# Patient Record
Sex: FEMALE | Race: WHITE | NOT HISPANIC OR LATINO | URBAN - METROPOLITAN AREA
[De-identification: names, ages, dates, MRNs, and addresses within clinical notes are randomized per-mention and may not be internally consistent; named-entity substitution may affect disease eponyms.]

---

## 2017-03-30 ENCOUNTER — OFFICE VISIT - HEALTHEAST (OUTPATIENT)
Dept: INTERNAL MEDICINE | Facility: CLINIC | Age: 27
End: 2017-03-30

## 2017-03-30 DIAGNOSIS — J02.9 SORE THROAT: ICD-10-CM

## 2017-03-30 DIAGNOSIS — H69.93 EUSTACHIAN TUBE DYSFUNCTION, BILATERAL: ICD-10-CM

## 2017-03-30 ASSESSMENT — MIFFLIN-ST. JEOR: SCORE: 1492.41

## 2017-05-31 ENCOUNTER — OFFICE VISIT - HEALTHEAST (OUTPATIENT)
Dept: INTERNAL MEDICINE | Facility: CLINIC | Age: 27
End: 2017-05-31

## 2017-05-31 DIAGNOSIS — R63.5 WEIGHT GAIN: ICD-10-CM

## 2017-05-31 LAB — HBA1C MFR BLD: 4.7 % (ref 3.5–6)

## 2017-06-01 ENCOUNTER — COMMUNICATION - HEALTHEAST (OUTPATIENT)
Dept: INTERNAL MEDICINE | Facility: CLINIC | Age: 27
End: 2017-06-01

## 2017-06-07 ENCOUNTER — OFFICE VISIT - HEALTHEAST (OUTPATIENT)
Dept: INTERNAL MEDICINE | Facility: CLINIC | Age: 27
End: 2017-06-07

## 2017-06-07 DIAGNOSIS — R63.5 WEIGHT GAIN: ICD-10-CM

## 2017-06-13 ENCOUNTER — COMMUNICATION - HEALTHEAST (OUTPATIENT)
Dept: INTERNAL MEDICINE | Facility: CLINIC | Age: 27
End: 2017-06-13

## 2017-06-30 ENCOUNTER — COMMUNICATION - HEALTHEAST (OUTPATIENT)
Dept: INTERNAL MEDICINE | Facility: CLINIC | Age: 27
End: 2017-06-30

## 2017-06-30 DIAGNOSIS — Z30.41 USES ORAL CONTRACEPTION: ICD-10-CM

## 2017-07-27 ENCOUNTER — AMBULATORY - HEALTHEAST (OUTPATIENT)
Dept: INTERNAL MEDICINE | Facility: CLINIC | Age: 27
End: 2017-07-27

## 2017-07-27 ENCOUNTER — COMMUNICATION - HEALTHEAST (OUTPATIENT)
Dept: INTERNAL MEDICINE | Facility: CLINIC | Age: 27
End: 2017-07-27

## 2017-07-27 DIAGNOSIS — L30.9 DERMATITIS: ICD-10-CM

## 2017-08-07 ENCOUNTER — RECORDS - HEALTHEAST (OUTPATIENT)
Dept: ADMINISTRATIVE | Facility: OTHER | Age: 27
End: 2017-08-07

## 2017-09-18 ENCOUNTER — RECORDS - HEALTHEAST (OUTPATIENT)
Dept: ADMINISTRATIVE | Facility: OTHER | Age: 27
End: 2017-09-18

## 2017-10-11 ENCOUNTER — COMMUNICATION - HEALTHEAST (OUTPATIENT)
Dept: INTERNAL MEDICINE | Facility: CLINIC | Age: 27
End: 2017-10-11

## 2017-10-11 DIAGNOSIS — Z30.41 USES ORAL CONTRACEPTION: ICD-10-CM

## 2018-01-03 ENCOUNTER — COMMUNICATION - HEALTHEAST (OUTPATIENT)
Dept: INTERNAL MEDICINE | Facility: CLINIC | Age: 28
End: 2018-01-03

## 2018-01-03 DIAGNOSIS — Z30.41 USES ORAL CONTRACEPTION: ICD-10-CM

## 2018-01-04 ENCOUNTER — COMMUNICATION - HEALTHEAST (OUTPATIENT)
Dept: SCHEDULING | Facility: CLINIC | Age: 28
End: 2018-01-04

## 2018-03-26 ENCOUNTER — COMMUNICATION - HEALTHEAST (OUTPATIENT)
Dept: INTERNAL MEDICINE | Facility: CLINIC | Age: 28
End: 2018-03-26

## 2018-03-26 DIAGNOSIS — Z30.41 USES ORAL CONTRACEPTION: ICD-10-CM

## 2018-04-18 ENCOUNTER — RECORDS - HEALTHEAST (OUTPATIENT)
Dept: ADMINISTRATIVE | Facility: OTHER | Age: 28
End: 2018-04-18

## 2018-04-20 ENCOUNTER — RECORDS - HEALTHEAST (OUTPATIENT)
Dept: ADMINISTRATIVE | Facility: OTHER | Age: 28
End: 2018-04-20

## 2018-06-25 ENCOUNTER — COMMUNICATION - HEALTHEAST (OUTPATIENT)
Dept: INTERNAL MEDICINE | Facility: CLINIC | Age: 28
End: 2018-06-25

## 2018-06-25 DIAGNOSIS — Z30.41 USES ORAL CONTRACEPTION: ICD-10-CM

## 2018-06-25 RX ORDER — NORGESTIMATE AND ETHINYL ESTRADIOL 0.25-0.035
1 KIT ORAL DAILY
Qty: 84 TABLET | Refills: 0 | Status: SHIPPED | OUTPATIENT
Start: 2018-06-25

## 2021-05-30 VITALS — BODY MASS INDEX: 30.2 KG/M2 | HEIGHT: 64 IN | WEIGHT: 176.9 LBS

## 2021-05-30 VITALS — WEIGHT: 178 LBS | BODY MASS INDEX: 30.55 KG/M2

## 2021-05-30 VITALS — WEIGHT: 176.8 LBS | BODY MASS INDEX: 30.35 KG/M2

## 2021-06-09 NOTE — PROGRESS NOTES
ASSESSMENT:  1. Sore throat  2. Eustachian tube dysfunction, bilateral  Michelle Julio is a 27-year-old woman who presents for sore throat, intermittent eustachian tube dysfunction.  I recommended antihistamine/Sudafed for the eustachian tube dysfunction, but penicillin twice a day for 10 days for the sore throat, as she tested positive for group A strep.   - Rapid Strep A Screen-Throat      PLAN:  Patient Instructions   Strep throat    Penicillin twice a day x 10 days       Eustachian tube dysfunction (ear)      --treat with antihistamines (claritin or benadryl)      --Sudafed also an option       Orders Placed This Encounter   Procedures     Rapid Strep A Screen-Throat     There are no discontinued medications.    Return if symptoms worsen or fail to improve, for Next scheduled follow up.    CHIEF COMPLAINT:  Chief Complaint   Patient presents with     Sore Throat     tender ears bilateral       HISTORY OF PRESENT ILLNESS:  Michelle is a 27 y.o. female presenting to the clinic today with concerns of strep. She is normally followed by Dr. Franco. She states that about 2-3 weeks ago she woke with a feeling that her left ear was plugged, as though her left ear was submerged in water and an echoing sound. However, after about a day and a half the symptoms resolved. Then, about 2-3 days ago she woke with the ear plugged again, which lasted for about 3 hours. Yesterday she woke with a sore throat and very slight prickly sensation radiating down her neck on either side from under both the ear lobes.  She elaborates that the sensation is akin to an itching sensation. The sore throat has persisted today and was the most sore last night. She cannot recall being exposed to anyone who is sick.     REVIEW OF SYSTEMS:   She denies any rhinorrhea, runny nose, or sneezing. She denies any fevers or chills.   All other systems are negative.    PFSH:  Reviewed, as above.     TOBACCO USE:  History   Smoking Status     Never Smoker  "  Smokeless Tobacco     Not on file       VITALS:  Vitals:    03/30/17 1424   BP: 132/66   Pulse: 64   Temp: 98.4  F (36.9  C)   Weight: 176 lb 14.4 oz (80.2 kg)   Height: 5' 4\" (1.626 m)     Wt Readings from Last 3 Encounters:   03/30/17 176 lb 14.4 oz (80.2 kg)   04/15/16 167 lb (75.8 kg)   10/19/15 167 lb (75.8 kg)       PHYSICAL EXAM:  General: Alert, pleasant, no distress.   Oropharynx:  Erythematous posterior pharynx. No tonsillar hypertrophy or exudate   Neck: No adenopathy or gland enlargement.   Ears: External canals, TM's clear.     ADDITIONAL HISTORY SUMMARIZED (2): None.  DECISION TO OBTAIN EXTRA INFORMATION (1): None.   RADIOLOGY TESTS (1): None.  LABS (1): Labs ordered.  MEDICINE TESTS (1): None.  INDEPENDENT REVIEW (2 each): None.     The visit lasted a total of 12 minutes face to face with the patient. Over 50% of the time was spent counseling and educating the patient about strep throat.    IVinay, am scribing for and in the presence of, Dr. Bhatia.    I, Dr. Bhatia, personally performed the services described in this documentation, as scribed by Vinay Nieto in my presence, and it is both accurate and complete.    MEDICATIONS:  Current Outpatient Prescriptions   Medication Sig Dispense Refill     MONONESSA, 28, 0.25-35 mg-mcg per tablet TAKE 1 TABLET BY MOUTH EVERY DAY. 84 tablet 11     penicillin VK (PEN VK) 500 MG tablet Take 1 tablet (500 mg total) by mouth 2 (two) times a day for 10 days. 20 tablet 0     SUMAtriptan (IMITREX) 25 MG tablet Take 1 tablet (25 mg total) by mouth as needed for migraine. 10 tablet 5     terbinafine HCl (LAMISIL) 250 mg tablet daily.  0     No current facility-administered medications for this visit.        Total data points: 1.     Vinay Bhatia, DO  Internal Medicine  [facility]    "

## 2021-06-11 NOTE — PROGRESS NOTES
[facility] Follow Up Note    Assessment/Plan:  1. Weight gain  Despite positive lifestyle changes of regular daily exercise and beach body diet.  Recommendations: Diagnostics as below.  Have encouraged her to keep a food journal for 1 week and bring with.  She should monitor portions.  Reduce alcohol to minimal while trying to lose weight.  Variety in workout, etc.  Follow-up in 3-4 weeks  - Thyroid Stimulating Hormone (TSH)  - T4, Free  - T3 (Triiodthyronine), Free  - Basic Metabolic Panel  - Glycosylated Hemoglobin A1c  - HM2(CBC w/o Differential)      Mayda Franco MD    Chief Complaint:  Chief Complaint   Patient presents with     Follow-up     Thyroid Problem     Discuss with MD        History of Present Illness:  Michelle is a 27 y.o. female who is seen here for discussion of weight gain over the past 2 years.  Michelle states that she is recently started a health program.  She is done the beach body diet with some success.  When she was vigilant with this plan, she noted a decrease in inches in her body.  However, the scales did not reduce much.  She states that she has added significant exercise to her diet and daily routine.  She has cut out a great deal of starches and junk food.  She is working hard to add more fruits and vegetables and eat lean protein.  Despite all of this, the scale continues to increase.  She states her weight is up quite a bit and she is feeling frustrated.  She does report that with her job change, she is not as active as she had been in the past.  She reports that she used to like to snack and has changed her snacks to things that are more nutritious.  She describes a diet rich in vegetables, moderate in fruits, plentiful and nuts and lean protein.  She states she eats out less.  She works out at [employer] most days of the week.  She is currently working with a .    In addition to the above, she states that although her energy felt good on the beach body  "diet, she does note some increased fatigue.  Her clothes are tighter.  Her family members wonder if she has a thyroid problem.  Of note, her family history is negative for thyroid disease.  She has a sister who has lost weight with just exercise.  She states her sister is \"tiny \".    Review of Systems:  A comprehensive review of systems was performed and was otherwise negative    PFSH:  Social History: She has a significant other.  They enjoy cooking together.  Is a non-smoker.  She is markedly reduced the types of alcoholic beverages that she drinks on the weekend.  She does not drink beer.  She is avoiding mixed drinks.  History   Smoking Status     Never Smoker   Smokeless Tobacco     Not on file       Past History: Significant for migraine headaches that have intensified slightly with reduction of caffeine  Current Outpatient Prescriptions   Medication Sig Dispense Refill     MONONESSA, 28, 0.25-35 mg-mcg per tablet TAKE 1 TABLET BY MOUTH EVERY DAY. 84 tablet 11     SUMAtriptan (IMITREX) 25 MG tablet Take 1 tablet (25 mg total) by mouth as needed for migraine. 10 tablet 5     No current facility-administered medications for this visit.        Family History: No thyroid disease    Physical Exam:  General Appearance:   She appears at baseline.  She is very pleasant.  Her affect is bright and vital signs are stable.  He is up 11 pounds from 2016.  Her weight in 2013 was 153 pounds.  Vitals:    05/31/17 1413   BP: 124/72   Patient Site: Left Arm   Patient Position: Sitting   Cuff Size: Adult Regular   Pulse: 72   Weight: 178 lb (80.7 kg)     Wt Readings from Last 3 Encounters:   05/31/17 178 lb (80.7 kg)   03/30/17 176 lb 14.4 oz (80.2 kg)   04/15/16 167 lb (75.8 kg)     Body mass index is 30.55 kg/(m^2).    Time spent today about 30 minutes with more than half that time spent in counseling and discussion of weight loss  "

## 2021-06-11 NOTE — PROGRESS NOTES
[facility] Follow Up Note    Assessment/Plan:  1. Weight gain  Review of laboratory studies from last visit revealed normal labs.  Discussed this with her in depth.  Recommendations: Add good-quality fat or protein to each meal or snack.  Continue to journal or monitor dietary intake.  Crosstraining regularly.  Keep water intake up.  Advance food preparation is important.  If worried about birth control as a contributor to weight gain, could consider IUD.  I will up in 3-4 months      30 minutes spent today in counseling on weight reduction    Mayda Franco MD    Chief Complaint:  Chief Complaint   Patient presents with     Follow-up     Labs       History of Present Illness:  Michelle is a 27 y.o. female who is here today for follow-up with regard to weight gain that has occurred over the past 3-4 years.  Please see procedure note for details.  Patient is feeling frustrated because she feels that she has tried many diets without success.  Of note, she is gained about 12 pounds over the past year.  She reports that this is been the timeframe that she moved in with her boyfriend.  Since journaling on her cell phone and reviewing her intake, she sees many areas of needed improvement.  She states she still does some mindless eating and snacking.  She also reports that she has been eating a fair amount of fruit.  Her diet is slightly low on good quality fats and protein.  He has questions with regard to hormones.  She is wondering if her birth control pill as a contributor.    Labs are reviewed with her today.  She has no evidence of a metabolic disorder.  She is not diabetic nor does she have thyroid disease.  Electrolytes are normal.    Review of Systems:  A comprehensive review of systems was performed and was otherwise negative    PFSH:  Social History: He lives with her significant other.  They enjoy cooking.  She is working out regularly now.  History   Smoking Status     Never Smoker   Smokeless  Tobacco     Not on file       Past History: Migraine headaches  Current Outpatient Prescriptions   Medication Sig Dispense Refill     MONONESSA, 28, 0.25-35 mg-mcg per tablet TAKE 1 TABLET BY MOUTH EVERY DAY. 84 tablet 11     SUMAtriptan (IMITREX) 25 MG tablet Take 1 tablet (25 mg total) by mouth as needed for migraine. 10 tablet 5     No current facility-administered medications for this visit.        Family History: Nothing new    Physical Exam:  General Appearance:   Pleasant and well-appearing and in no acute distress.  Her body mass index is as noted.  Of note, she is well muscled  Vitals:    06/07/17 0830   BP: 112/62   Patient Site: Left Arm   Patient Position: Sitting   Cuff Size: Adult Large   Pulse: 72   Weight: 176 lb 12.8 oz (80.2 kg)     Wt Readings from Last 3 Encounters:   06/07/17 176 lb 12.8 oz (80.2 kg)   05/31/17 178 lb (80.7 kg)   03/30/17 176 lb 14.4 oz (80.2 kg)     Body mass index is 30.35 kg/(m^2).